# Patient Record
Sex: MALE | Race: WHITE | URBAN - METROPOLITAN AREA
[De-identification: names, ages, dates, MRNs, and addresses within clinical notes are randomized per-mention and may not be internally consistent; named-entity substitution may affect disease eponyms.]

---

## 2021-11-08 ENCOUNTER — OFFICE VISIT (OUTPATIENT)
Dept: URBAN - METROPOLITAN AREA CLINIC 10 | Facility: CLINIC | Age: 31
End: 2021-11-08
Payer: COMMERCIAL

## 2021-11-08 DIAGNOSIS — H52.4 PRESBYOPIA: ICD-10-CM

## 2021-11-08 DIAGNOSIS — H04.123 DRY EYE SYNDROME OF BILATERAL LACRIMAL GLANDS: Primary | ICD-10-CM

## 2021-11-08 PROCEDURE — 92004 COMPRE OPH EXAM NEW PT 1/>: CPT | Performed by: STUDENT IN AN ORGANIZED HEALTH CARE EDUCATION/TRAINING PROGRAM

## 2021-11-08 PROCEDURE — 92134 CPTRZ OPH DX IMG PST SGM RTA: CPT | Performed by: STUDENT IN AN ORGANIZED HEALTH CARE EDUCATION/TRAINING PROGRAM

## 2021-11-08 ASSESSMENT — VISUAL ACUITY
OD: 20/25
OS: 20/25

## 2021-11-08 ASSESSMENT — INTRAOCULAR PRESSURE
OD: 16
OS: 16

## 2021-11-08 ASSESSMENT — KERATOMETRY
OD: 45.38
OS: 45.25

## 2022-09-15 ENCOUNTER — OFFICE VISIT (OUTPATIENT)
Dept: URBAN - METROPOLITAN AREA CLINIC 10 | Facility: CLINIC | Age: 32
End: 2022-09-15
Payer: COMMERCIAL

## 2022-09-15 DIAGNOSIS — H04.123 DRY EYE SYNDROME OF BILATERAL LACRIMAL GLANDS: Primary | ICD-10-CM

## 2022-09-15 DIAGNOSIS — H15.111 EPISCLERITIS PERIODICA FUGAX, RIGHT EYE: ICD-10-CM

## 2022-09-15 DIAGNOSIS — H52.221 REGULAR ASTIGMATISM, RIGHT EYE: ICD-10-CM

## 2022-09-15 PROCEDURE — 99214 OFFICE O/P EST MOD 30 MIN: CPT | Performed by: STUDENT IN AN ORGANIZED HEALTH CARE EDUCATION/TRAINING PROGRAM

## 2022-09-15 RX ORDER — PREDNISOLONE ACETATE 10 MG/ML
1 % SUSPENSION/ DROPS OPHTHALMIC
Qty: 10 | Refills: 1 | Status: ACTIVE
Start: 2022-09-15

## 2022-09-15 ASSESSMENT — VISUAL ACUITY
OD: 20/15
OS: 20/20

## 2022-09-15 ASSESSMENT — INTRAOCULAR PRESSURE
OD: 16
OS: 16

## 2022-09-15 NOTE — IMPRESSION/PLAN
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 Plan: Possible mild episcleritis based on photos and presentation today Start Pred Forte QID OD with weekly taper as discussed RTC 1 year for CE or if NI Discussed follow up with PCP if recurring